# Patient Record
Sex: FEMALE | URBAN - METROPOLITAN AREA
[De-identification: names, ages, dates, MRNs, and addresses within clinical notes are randomized per-mention and may not be internally consistent; named-entity substitution may affect disease eponyms.]

---

## 2023-06-20 ENCOUNTER — APPOINTMENT (RX ONLY)
Dept: URBAN - METROPOLITAN AREA CLINIC 176 | Facility: CLINIC | Age: 53
Setting detail: DERMATOLOGY
End: 2023-06-20

## 2023-06-20 DIAGNOSIS — Z41.9 ENCOUNTER FOR PROCEDURE FOR PURPOSES OTHER THAN REMEDYING HEALTH STATE, UNSPECIFIED: ICD-10-CM

## 2023-06-20 PROCEDURE — ? BOTOX

## 2023-06-20 PROCEDURE — ? ADDITIONAL NOTES

## 2023-06-20 PROCEDURE — ? COSMETIC CONSULTATION: JEUVEAU

## 2023-06-20 PROCEDURE — ? COSMETIC CONSULTATION: BOTOX

## 2023-06-20 PROCEDURE — ? COSMETIC CONSULTATION: DYSPORT

## 2023-06-20 NOTE — PROCEDURE: BOTOX
L Brow Units: 0
Show Right And Left Brow Units: No
Show Depressor Anguli Units: Yes
Detail Level: Detailed
Glabellar Complex Units: 20
Inferior Lateral Orbicularis Oculi Units: 10
Expiration Date (Month Year): 11/2025
Price (Use Numbers Only, No Special Characters Or $): 018
Consent: Written consent obtained. Risks include but not limited to lid/brow ptosis, bruising, swelling, diplopia, temporary effect, incomplete chemical denervation.
Additional Area 2 Location: [facility]
Post-Care Instructions: Patient instructed to not lie down for 4 hours and limit physical activity for 24 hours. Patient instructed not to travel by airplane for 48 hours.
Lot #: [id number]
Dilution (U/0.1 Cc): 5
Additional Area 1 Location: Perioral
Incrementing Botox Units: By 0.5 Units
Forehead Units: 15
Additional Area 3 Location: Mental

## 2023-06-20 NOTE — PROCEDURE: ADDITIONAL NOTES
Additional Notes: Reviewed the different pricing of B/D/J. Patient informed of the different rewards programs for each brand. Patient has strong movement in the glabella and suggested for 20 units, 15 in the forehead and 5 units for a brow lift and another 5 for crows. Patient would like to stick with botox as she is familiar with it.
Render Risk Assessment In Note?: yes
Detail Level: Detailed

## 2023-07-25 ENCOUNTER — APPOINTMENT (RX ONLY)
Dept: URBAN - METROPOLITAN AREA CLINIC 176 | Facility: CLINIC | Age: 53
Setting detail: DERMATOLOGY
End: 2023-07-25

## 2023-07-25 DIAGNOSIS — Z41.9 ENCOUNTER FOR PROCEDURE FOR PURPOSES OTHER THAN REMEDYING HEALTH STATE, UNSPECIFIED: ICD-10-CM

## 2023-07-25 PROCEDURE — ? BOTOX

## 2023-10-17 ENCOUNTER — APPOINTMENT (RX ONLY)
Dept: URBAN - METROPOLITAN AREA CLINIC 176 | Facility: CLINIC | Age: 53
Setting detail: DERMATOLOGY
End: 2023-10-17

## 2023-10-17 DIAGNOSIS — Z41.9 ENCOUNTER FOR PROCEDURE FOR PURPOSES OTHER THAN REMEDYING HEALTH STATE, UNSPECIFIED: ICD-10-CM

## 2023-10-17 PROCEDURE — ? BOTOX

## 2023-10-17 NOTE — PROCEDURE: BOTOX
L Brow Units: 0
Show Right And Left Brow Units: No
Show Depressor Anguli Units: Yes
Detail Level: Detailed
Glabellar Complex Units: 20
Inferior Lateral Orbicularis Oculi Units: 20
Expiration Date (Month Year): 4/2026
Price (Use Numbers Only, No Special Characters Or $): 569
Consent: Written consent obtained. Risks include but not limited to lid/brow ptosis, bruising, swelling, diplopia, temporary effect, incomplete chemical denervation.
Additional Area 2 Location: [facility] [address]
Post-Care Instructions: Patient instructed to not lie down for 4 hours and limit physical activity for 24 hours. Patient instructed not to travel by airplane for 48 hours.
Lot #: [id number]
Dilution (U/0.1 Cc): 5
Additional Area 1 Location: Perioral
Incrementing Botox Units: By 0.5 Units
Additional Area 3 Location: Mental

## 2024-01-08 ENCOUNTER — APPOINTMENT (RX ONLY)
Dept: URBAN - METROPOLITAN AREA CLINIC 176 | Facility: CLINIC | Age: 54
Setting detail: DERMATOLOGY
End: 2024-01-08

## 2024-01-08 DIAGNOSIS — Z41.9 ENCOUNTER FOR PROCEDURE FOR PURPOSES OTHER THAN REMEDYING HEALTH STATE, UNSPECIFIED: ICD-10-CM

## 2024-01-08 PROCEDURE — ? BOTOX

## 2024-01-08 NOTE — PROCEDURE: BOTOX
L Brow Units: 0
Show Right And Left Brow Units: No
Show Depressor Anguli Units: Yes
Detail Level: Detailed
Glabellar Complex Units: 20
Expiration Date (Month Year): 4/2026
Price (Use Numbers Only, No Special Characters Or $): 363
Consent: Written consent obtained. Risks include but not limited to lid/brow ptosis, bruising, swelling, diplopia, temporary effect, incomplete chemical denervation.
Additional Area 2 Location: [facility]
Post-Care Instructions: Patient instructed to not lie down for 4 hours and limit physical activity for 24 hours. Patient instructed not to travel by airplane for 48 hours.
Lot #: [id number]
Dilution (U/0.1 Cc): 5
Additional Area 1 Location: Perioral
Incrementing Botox Units: By 0.5 Units
Additional Area 3 Location: Mental

## 2024-03-27 ENCOUNTER — APPOINTMENT (RX ONLY)
Dept: URBAN - METROPOLITAN AREA CLINIC 176 | Facility: CLINIC | Age: 54
Setting detail: DERMATOLOGY
End: 2024-03-27

## 2024-03-27 DIAGNOSIS — Z41.9 ENCOUNTER FOR PROCEDURE FOR PURPOSES OTHER THAN REMEDYING HEALTH STATE, UNSPECIFIED: ICD-10-CM

## 2024-03-27 PROCEDURE — ? BOTOX

## 2024-03-27 PROCEDURE — ? DAXXIFY

## 2024-03-27 NOTE — PROCEDURE: DAXXIFY
Left Pupillary Line Units: 0
Expiration Date (Month Year): 2024/09
Forehead Units: 30
Lot #: [id number]
Glabellar Complex Units: 40
Detail Level: Detailed
Bill Summary Price Listed Below, Or Bill Total Of Units X Price Per Unit?: Bill Summary Price Below
Dilution (U/0.1 Cc): 4
Price (Use Numbers Only, No Special Characters Or $): 872
Show Levator Superior Units: Yes
Show Right And Left Brow Units: No
Post-Care Instructions: Patient instructed to not lie down for 4 hours and limit physical activity for 24 hours.
Consent: Written consent obtained. Risks include but not limited to lid/brow ptosis, bruising, swelling, diplopia, temporary effect, incomplete chemical denervation.

## 2024-03-27 NOTE — PROCEDURE: BOTOX
Periorbital Skin Units: 0
Show Mentalis Units: No
Expiration Date (Month Year): 4/2026
Show Additional Area 3: Yes
Additional Area 4 Location: Faina/platysma
Detail Level: Detailed
Lot #: [id number]
Additional Area 3 Location: Mental
Consent: Written consent obtained. Risks include but not limited to lid/brow ptosis, bruising, swelling, diplopia, temporary effect, incomplete chemical denervation.
Dilution (U/0.1 Cc): 4
Depressor Anguli Oris Units: 5
Post-Care Instructions: Patient instructed to not lie down for 4 hours and limit physical activity for 24 hours. Patient instructed not to travel by airplane for 48 hours.
Additional Area 2 Location: [facility]
Additional Area 1 Location: Perioral
Incrementing Botox Units: By 0.5 Units

## 2024-06-25 ENCOUNTER — APPOINTMENT (RX ONLY)
Dept: URBAN - METROPOLITAN AREA CLINIC 176 | Facility: CLINIC | Age: 54
Setting detail: DERMATOLOGY
End: 2024-06-25

## 2024-06-25 DIAGNOSIS — Z41.9 ENCOUNTER FOR PROCEDURE FOR PURPOSES OTHER THAN REMEDYING HEALTH STATE, UNSPECIFIED: ICD-10-CM

## 2024-06-25 PROCEDURE — ? DAXXIFY

## 2024-06-25 NOTE — PROCEDURE: DAXXIFY
Left Pupillary Line Units: 0
Expiration Date (Month Year): 2025/11
Forehead Units: 30
Periorbital Skin Units: 20
Lot #: [id number]
Glabellar Complex Units: 40
Detail Level: Detailed
Bill Summary Price Listed Below, Or Bill Total Of Units X Price Per Unit?: Bill Summary Price Below
Depressor Anguli Oris Units: 10
Dilution (U/0.1 Cc): 4
Price (Use Numbers Only, No Special Characters Or $): 987
Show Levator Superior Units: Yes
Show Right And Left Brow Units: No
Post-Care Instructions: Patient instructed to not lie down for 4 hours and limit physical activity for 24 hours.
Consent: Written consent obtained. Risks include but not limited to lid/brow ptosis, bruising, swelling, diplopia, temporary effect, incomplete chemical denervation.

## 2024-09-12 ENCOUNTER — APPOINTMENT (RX ONLY)
Dept: URBAN - METROPOLITAN AREA CLINIC 176 | Facility: CLINIC | Age: 54
Setting detail: DERMATOLOGY
End: 2024-09-12

## 2024-09-12 DIAGNOSIS — Z41.9 ENCOUNTER FOR PROCEDURE FOR PURPOSES OTHER THAN REMEDYING HEALTH STATE, UNSPECIFIED: ICD-10-CM

## 2024-09-12 PROCEDURE — ? DAXXIFY

## 2024-09-12 NOTE — PROCEDURE: DAXXIFY
Left Pupillary Line Units: 0
Expiration Date (Month Year): 2024/09
Additional Area 1 Location: lip flip
Forehead Units: 30
Periorbital Skin Units: 20
Lot #: [id number]
Detail Level: Detailed
Bill Summary Price Listed Below, Or Bill Total Of Units X Price Per Unit?: Bill Summary Price Below
Additional Area 1 Units: 10
Dilution (U/0.1 Cc): 4
Price (Use Numbers Only, No Special Characters Or $): 057
Show Levator Superior Units: Yes
Show Right And Left Brow Units: No
Post-Care Instructions: Patient instructed to not lie down for 4 hours and limit physical activity for 24 hours.
Consent: Written consent obtained. Risks include but not limited to lid/brow ptosis, bruising, swelling, diplopia, temporary effect, incomplete chemical denervation.

## 2024-12-09 ENCOUNTER — APPOINTMENT (RX ONLY)
Dept: URBAN - METROPOLITAN AREA CLINIC 176 | Facility: CLINIC | Age: 54
Setting detail: DERMATOLOGY
End: 2024-12-09

## 2024-12-09 DIAGNOSIS — Z41.9 ENCOUNTER FOR PROCEDURE FOR PURPOSES OTHER THAN REMEDYING HEALTH STATE, UNSPECIFIED: ICD-10-CM

## 2024-12-09 PROCEDURE — ? DAXXIFY

## 2024-12-09 NOTE — PROCEDURE: DAXXIFY
Left Pupillary Line Units: 0
Expiration Date (Month Year): 2025/11
Additional Area 1 Location: lip flip
Forehead Units: 30
Periorbital Skin Units: 20
Lot #: [id number]
Detail Level: Detailed
Bill Summary Price Listed Below, Or Bill Total Of Units X Price Per Unit?: Bill Summary Price Below
Additional Area 1 Units: 10
Dilution (U/0.1 Cc): 4
Price (Use Numbers Only, No Special Characters Or $): 985
Show Levator Superior Units: Yes
Show Right And Left Brow Units: No
Post-Care Instructions: Patient instructed to not lie down for 4 hours and limit physical activity for 24 hours.
Consent: Written consent obtained. Risks include but not limited to lid/brow ptosis, bruising, swelling, diplopia, temporary effect, incomplete chemical denervation.

## 2025-03-31 ENCOUNTER — APPOINTMENT (OUTPATIENT)
Dept: URBAN - METROPOLITAN AREA CLINIC 176 | Facility: CLINIC | Age: 55
Setting detail: DERMATOLOGY
End: 2025-03-31

## 2025-03-31 DIAGNOSIS — Z41.9 ENCOUNTER FOR PROCEDURE FOR PURPOSES OTHER THAN REMEDYING HEALTH STATE, UNSPECIFIED: ICD-10-CM

## 2025-03-31 PROCEDURE — ? BOTOX

## 2025-03-31 NOTE — PROCEDURE: BOTOX
Additional Area 5 Units: 0
Incrementing Botox Units: By 0.5 Units
Show Forehead Units: Yes
Additional Area 1 Units: 5
Forehead Units: 15
Show Right And Left Pupillary Line Units: No
Detail Level: Detailed
Expiration Date (Month Year): 5/2026
Price (Use Numbers Only, No Special Characters Or $): 568
Additional Area 2 Location: chin
Consent: Written consent obtained. Risks include but not limited to lid/brow ptosis, bruising, swelling, diplopia, temporary effect, incomplete chemical denervation.
Post-Care Instructions: Patient instructed to not lie down for 30 minutes.  Dynamic movement of treated areas may enhance the effect of the neuromodulator and is recommend every 5 minutes for a 30 min interval.
Periorbital Skin Units: 10
Lot #: [id number]
Additional Area 1 Location: perioral

## 2025-05-01 ENCOUNTER — APPOINTMENT (OUTPATIENT)
Dept: URBAN - METROPOLITAN AREA CLINIC 176 | Facility: CLINIC | Age: 55
Setting detail: DERMATOLOGY
End: 2025-05-01

## 2025-06-09 ENCOUNTER — APPOINTMENT (OUTPATIENT)
Dept: URBAN - METROPOLITAN AREA CLINIC 176 | Facility: CLINIC | Age: 55
Setting detail: DERMATOLOGY
End: 2025-06-09

## 2025-06-09 DIAGNOSIS — Z41.9 ENCOUNTER FOR PROCEDURE FOR PURPOSES OTHER THAN REMEDYING HEALTH STATE, UNSPECIFIED: ICD-10-CM

## 2025-06-09 PROCEDURE — ? DAXXIFY

## 2025-06-09 NOTE — PROCEDURE: DAXXIFY
Left Pupillary Line Units: 0
Expiration Date (Month Year): 2025/11
Additional Area 1 Location: lip flip
Forehead Units: 30
Periorbital Skin Units: 20
Lot #: [id number]
Detail Level: Detailed
Bill Summary Price Listed Below, Or Bill Total Of Units X Price Per Unit?: Bill Summary Price Below
Additional Area 1 Units: 10
Dilution (U/0.1 Cc): 4
Price (Use Numbers Only, No Special Characters Or $): 388
Show Levator Superior Units: Yes
Show Right And Left Brow Units: No
Post-Care Instructions: Patient instructed to not lie down for 4 hours and limit physical activity for 24 hours.
Consent: Written consent obtained. Risks include but not limited to lid/brow ptosis, bruising, swelling, diplopia, temporary effect, incomplete chemical denervation.

## 2025-07-21 ENCOUNTER — APPOINTMENT (OUTPATIENT)
Dept: URBAN - METROPOLITAN AREA CLINIC 176 | Facility: CLINIC | Age: 55
Setting detail: DERMATOLOGY
End: 2025-07-21

## 2025-07-21 DIAGNOSIS — Z41.9 ENCOUNTER FOR PROCEDURE FOR PURPOSES OTHER THAN REMEDYING HEALTH STATE, UNSPECIFIED: ICD-10-CM

## 2025-07-21 PROCEDURE — ? BOTOX

## 2025-07-21 NOTE — PROCEDURE: BOTOX
Additional Area 5 Units: 0
Incrementing Botox Units: By 0.5 Units
Show Forehead Units: Yes
Additional Area 1 Units: 5
Forehead Units: 15
Show Right And Left Pupillary Line Units: No
Detail Level: Detailed
Expiration Date (Month Year): 6/2027
Price (Use Numbers Only, No Special Characters Or $): 922
Additional Area 2 Location: chin
Consent: Written consent obtained. Risks include but not limited to lid/brow ptosis, bruising, swelling, diplopia, temporary effect, incomplete chemical denervation.
Post-Care Instructions: Patient instructed to not lie down for 30 minutes.  Dynamic movement of treated areas may enhance the effect of the neuromodulator and is recommend every 5 minutes for a 30 min interval.
Periorbital Skin Units: 10
Lot #: [id number]
Additional Area 1 Location: perioral